# Patient Record
Sex: FEMALE | Race: WHITE | NOT HISPANIC OR LATINO | Employment: FULL TIME | URBAN - METROPOLITAN AREA
[De-identification: names, ages, dates, MRNs, and addresses within clinical notes are randomized per-mention and may not be internally consistent; named-entity substitution may affect disease eponyms.]

---

## 2020-01-27 ENCOUNTER — HOSPITAL ENCOUNTER (OUTPATIENT)
Dept: GENERAL RADIOLOGY | Age: 25
Discharge: HOME | End: 2020-01-27

## 2020-01-27 DIAGNOSIS — T83.32XA IUD STRINGS LOST: ICD-10-CM

## 2020-01-27 PROCEDURE — 74018 RADEX ABDOMEN 1 VIEW: CPT

## 2020-03-26 DIAGNOSIS — T83.32XA IUD STRINGS LOST: Primary | ICD-10-CM

## 2021-03-30 ENCOUNTER — TELEPHONE (OUTPATIENT)
Dept: FAMILY MEDICINE | Age: 26
End: 2021-03-30

## 2022-10-24 ENCOUNTER — OFFICE VISIT (OUTPATIENT)
Dept: FAMILY MEDICINE CLINIC | Facility: CLINIC | Age: 27
End: 2022-10-24
Payer: COMMERCIAL

## 2022-10-24 VITALS
DIASTOLIC BLOOD PRESSURE: 80 MMHG | HEIGHT: 65 IN | WEIGHT: 131.63 LBS | SYSTOLIC BLOOD PRESSURE: 116 MMHG | BODY MASS INDEX: 21.93 KG/M2 | HEART RATE: 78 BPM | RESPIRATION RATE: 16 BRPM | TEMPERATURE: 97 F

## 2022-10-24 DIAGNOSIS — Z00.00 ROUTINE PHYSICAL EXAMINATION: Primary | ICD-10-CM

## 2022-10-24 DIAGNOSIS — Z01.419 ENCOUNTER FOR ANNUAL ROUTINE GYNECOLOGICAL EXAMINATION: ICD-10-CM

## 2022-10-24 PROCEDURE — 3074F SYST BP LT 130 MM HG: CPT | Performed by: FAMILY MEDICINE

## 2022-10-24 PROCEDURE — 3008F BODY MASS INDEX DOCD: CPT | Performed by: FAMILY MEDICINE

## 2022-10-24 PROCEDURE — 90632 HEPA VACCINE ADULT IM: CPT | Performed by: FAMILY MEDICINE

## 2022-10-24 PROCEDURE — 90471 IMMUNIZATION ADMIN: CPT | Performed by: FAMILY MEDICINE

## 2022-10-24 PROCEDURE — 3079F DIAST BP 80-89 MM HG: CPT | Performed by: FAMILY MEDICINE

## 2022-10-24 PROCEDURE — 90713 POLIOVIRUS IPV SC/IM: CPT | Performed by: FAMILY MEDICINE

## 2022-10-24 PROCEDURE — 90472 IMMUNIZATION ADMIN EACH ADD: CPT | Performed by: FAMILY MEDICINE

## 2022-10-24 PROCEDURE — 99385 PREV VISIT NEW AGE 18-39: CPT | Performed by: FAMILY MEDICINE

## 2022-10-24 RX ORDER — AZITHROMYCIN 500 MG/1
500 TABLET, FILM COATED ORAL DAILY
Qty: 3 TABLET | Refills: 0 | Status: SHIPPED | OUTPATIENT
Start: 2022-10-24 | End: 2022-10-27

## 2022-10-24 RX ORDER — LEVONORGESTREL / ETHINYL ESTRADIOL AND ETHINYL ESTRADIOL 150-30(84)
1 KIT ORAL DAILY
Qty: 91 TABLET | Refills: 3 | Status: SHIPPED | OUTPATIENT
Start: 2022-10-24

## 2022-10-24 RX ORDER — LEVONORGESTREL / ETHINYL ESTRADIOL AND ETHINYL ESTRADIOL 150-30(84)
1 KIT ORAL DAILY
COMMUNITY
Start: 2022-06-02 | End: 2022-10-24

## 2022-10-24 RX ORDER — CETIRIZINE HYDROCHLORIDE 10 MG/1
10 TABLET ORAL DAILY
COMMUNITY

## 2022-10-24 RX ORDER — SPIRONOLACTONE 50 MG/1
50 TABLET, FILM COATED ORAL DAILY
COMMUNITY
Start: 2022-09-26

## 2022-10-24 RX ORDER — DEXTROAMPHETAMINE SACCHARATE, AMPHETAMINE ASPARTATE MONOHYDRATE, DEXTROAMPHETAMINE SULFATE AND AMPHETAMINE SULFATE 5; 5; 5; 5 MG/1; MG/1; MG/1; MG/1
20 CAPSULE, EXTENDED RELEASE ORAL EVERY MORNING
COMMUNITY

## 2022-10-24 RX ORDER — ATOVAQUONE AND PROGUANIL HYDROCHLORIDE 250; 100 MG/1; MG/1
1 TABLET, FILM COATED ORAL DAILY
Qty: 20 TABLET | Refills: 0 | Status: SHIPPED | OUTPATIENT
Start: 2022-10-24

## 2023-07-25 NOTE — TELEPHONE ENCOUNTER
Clarification sent regarding pregnancy.       Very High  Pregnancy Warning: Juancarlos Neal 91-DayContraindicated for Pregnancy  Patient pregnancy status is unknown  Details

## 2023-08-01 RX ORDER — LEVONORGESTREL / ETHINYL ESTRADIOL AND ETHINYL ESTRADIOL 150-30(84)
1 KIT ORAL DAILY
Qty: 91 TABLET | Refills: 0 | Status: SHIPPED | OUTPATIENT
Start: 2023-08-01

## 2023-10-06 NOTE — TELEPHONE ENCOUNTER
Please review; No Protocol  Patient comment: Traveling to [place], [place] and [facility] from 11/17-11/27   Rx Pended, authorize if appropriate    Requested Prescriptions   Pending Prescriptions Disp Refills    Atovaquone-Proguanil HCl 250-100 MG Oral Tab 20 tablet 0     Sig: Take 1 tablet by mouth daily.        There is no refill protocol information for this order          Recent Outpatient Visits              11 months ago Routine physical examination    Bernadine Adams [place], [place]    Office Visit          Future Appointments         Provider Department Appt Notes    In 3 months DO Mauricio Garcia [place] annual physical

## 2023-10-08 RX ORDER — ATOVAQUONE AND PROGUANIL HYDROCHLORIDE 250; 100 MG/1; MG/1
1 TABLET, FILM COATED ORAL DAILY
Qty: 20 TABLET | Refills: 0 | Status: SHIPPED | OUTPATIENT
Start: 2023-10-08

## 2023-10-09 NOTE — TELEPHONE ENCOUNTER
Refilled.   Must start a few days before trip, take daily throughout trip and continue for a week after

## 2023-10-09 NOTE — TELEPHONE ENCOUNTER
[employer] message with MD recommendation sent to pt.        Future Appointments   Date Time Provider Ajith Madera   1/11/2024  4:00 PM DO OSIRIS Joel

## 2024-01-11 ENCOUNTER — OFFICE VISIT (OUTPATIENT)
Dept: FAMILY MEDICINE CLINIC | Facility: CLINIC | Age: 29
End: 2024-01-11

## 2024-01-11 VITALS
BODY MASS INDEX: 20.91 KG/M2 | HEART RATE: 83 BPM | DIASTOLIC BLOOD PRESSURE: 77 MMHG | HEIGHT: 64.57 IN | TEMPERATURE: 98 F | WEIGHT: 124 LBS | SYSTOLIC BLOOD PRESSURE: 116 MMHG | RESPIRATION RATE: 16 BRPM

## 2024-01-11 DIAGNOSIS — Z01.419 ENCOUNTER FOR ANNUAL ROUTINE GYNECOLOGICAL EXAMINATION: ICD-10-CM

## 2024-01-11 DIAGNOSIS — R79.9 ABNORMAL BLOOD CHEMISTRY: ICD-10-CM

## 2024-01-11 DIAGNOSIS — Z00.00 ROUTINE PHYSICAL EXAMINATION: Primary | ICD-10-CM

## 2024-01-11 DIAGNOSIS — Z78.9 VEGETARIANISM: ICD-10-CM

## 2024-01-11 PROCEDURE — 3074F SYST BP LT 130 MM HG: CPT | Performed by: FAMILY MEDICINE

## 2024-01-11 PROCEDURE — 99395 PREV VISIT EST AGE 18-39: CPT | Performed by: FAMILY MEDICINE

## 2024-01-11 PROCEDURE — 3078F DIAST BP <80 MM HG: CPT | Performed by: FAMILY MEDICINE

## 2024-01-11 PROCEDURE — 3008F BODY MASS INDEX DOCD: CPT | Performed by: FAMILY MEDICINE

## 2024-01-11 RX ORDER — LEVONORGESTREL / ETHINYL ESTRADIOL AND ETHINYL ESTRADIOL 150-30(84)
1 KIT ORAL DAILY
Qty: 91 TABLET | Refills: 3 | Status: SHIPPED | OUTPATIENT
Start: 2024-01-11

## 2024-01-11 NOTE — PROGRESS NOTES
HPI:  28 yr old female who presents for physical. Single. 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 Engaged and getting  in September in [employer]. Exercising regularly. Just did a marathon. Traveled to [facility] this year.     On Adderall for ADHD.  Follows with Psychiatrist.    Normal periods every 3 months.  On Seasonique. Due for pap.  Never had abnormal.     PMHx: reviewed, see chart  PSHx: reviewed, see chart  All: Penicillins   Meds: see chart    ROS:   Allergic/Immuno:  Negative for environmental allergies and food allergies  Cardiovascular:  Negative for chest pain and irregular heartbeat/palpitations  Constitutional:  Negative for decreased activity, fever, irritability and lethargy  Endocrine:  Negative for abnormal sleep patterns, increased activity, polydipsia and polyphagia  ENMT:  Negative for ear drainage, hearing loss and nasal drainage  Eyes:  Negative for eye discharge and vision loss  Gastrointestinal:  Negative for abdominal pain, constipation, decreased appetite, diarrhea and vomiting  Genitourinary:  Negative for dysuria and hematuria  Hema/Lymph:  Negative for easy bleeding and easy bruising  Integumentary:  Negative for pruritus and rash  Musculoskeletal:  Negative for bone/joint symptoms  Neurological:  Negative for gait disturbance  Psychiatric:  Negative for inappropriate interaction and psychiatric symptoms    PE:  /77   Pulse 83   Temp 97.8 °F (36.6 °C) (Temporal)   Resp 16   Ht 5' 4.57\" (1.64 m)   Wt 124 lb (56.2 kg)   LMP 12/18/2023   BMI 20.91 kg/m²   Gen:  Well-nourished.  No distress.  HEENT: Conjunctive clear.  Ye ear canals clear.  Ye TMs intact with good landmarks noted.  Nares patent.  Oral mucous membrane moist.  Normal lips, teeth, and gums.  Oropharynx normal.  Neck supple.  Good ROM.  No LAD.  Thyroid normal.  CV:  Regular rate and rhythm; no murmurs  Lungs:  Clear to ausculation; good aeration               No wheezes, rales  or rhonchi  Abd: soft, non-tender, non-distended          Normal bowel sounds; no masses          No hepatosplenomegaly  Breasts:  Normal appearance bilateral.  No masses or lesions noted.  Normal nipples bilateral.  No nipple discharge noted.  Normal lymph nodes.  :  External genitalia normal.  Urethral meatus normal.  No lesions noted.  Vaginal mucose normal.  Normal cervix.  No uterine or ovarian masses noted on exam.  Extremities: No cyanosis, clubbing, edema.  Pedal pulses 2+ alan.  MSK:  No abnormalities.  Skin: Warm/dry/intact.  No abnormal moles/lesions noted.  No rashes.  Psych: Orientated to person, place, and time.  Behavior and affect appropriate for age.    A/P:  Encounter Diagnoses   Name Primary?    Routine physical examination    Healthy.  Getting  in September. Labs done.    Yes    Encounter for annual routine gynecological examination    Pap done.  Will call with results.        Vegetarianism    Check iron and vitamin levels.        Abnormal blood chemistry    Check labs today.        Colleen Weiler, DO

## 2024-01-12 LAB
% SATURATION: 21 % (CALC) (ref 16–45)
ALBUMIN/GLOBULIN RATIO: 1.8 (CALC) (ref 1–2.5)
ALBUMIN: 4.7 G/DL (ref 3.6–5.1)
ALKALINE PHOSPHATASE: 44 U/L (ref 31–125)
ALT: 14 U/L (ref 6–29)
AST: 19 U/L (ref 10–30)
BILIRUBIN, TOTAL: 1.3 MG/DL (ref 0.2–1.2)
BUN: 11 MG/DL (ref 7–25)
CALCIUM: 9.6 MG/DL (ref 8.6–10.2)
CARBON DIOXIDE: 25 MMOL/L (ref 20–32)
CHLORIDE: 101 MMOL/L (ref 98–110)
CHOL/HDLC RATIO: 2.6 (CALC)
CHOLESTEROL, TOTAL: 204 MG/DL
CREATININE: 0.66 MG/DL (ref 0.5–0.96)
EGFR: 122 ML/MIN/1.73M2
FERRITIN: 19 NG/ML (ref 16–154)
GLOBULIN: 2.6 G/DL (CALC) (ref 1.9–3.7)
GLUCOSE: 77 MG/DL (ref 65–99)
HDL CHOLESTEROL: 79 MG/DL
HEMATOCRIT: 37.6 % (ref 35–45)
HEMOGLOBIN: 12.9 G/DL (ref 11.7–15.5)
IRON BINDING CAPACITY: 450 MCG/DL (CALC) (ref 250–450)
IRON, TOTAL: 96 MCG/DL (ref 40–190)
LDL-CHOLESTEROL: 107 MG/DL (CALC)
MCH: 31.6 PG (ref 27–33)
MCHC: 34.3 G/DL (ref 32–36)
MCV: 92.2 FL (ref 80–100)
MPV: 10.5 FL (ref 7.5–12.5)
NON-HDL CHOLESTEROL: 125 MG/DL (CALC)
PLATELET COUNT: 411 THOUSAND/UL (ref 140–400)
POTASSIUM: 3.9 MMOL/L (ref 3.5–5.3)
PROTEIN, TOTAL: 7.3 G/DL (ref 6.1–8.1)
RDW: 12 % (ref 11–15)
RED BLOOD CELL COUNT: 4.08 MILLION/UL (ref 3.8–5.1)
SODIUM: 137 MMOL/L (ref 135–146)
TRIGLYCERIDES: 89 MG/DL
TSH: 0.95 MIU/L
VITAMIN B12: 693 PG/ML (ref 200–1100)
VITAMIN D, 25-OH, TOTAL: 43 NG/ML (ref 30–100)
WHITE BLOOD CELL COUNT: 7.2 THOUSAND/UL (ref 3.8–10.8)

## 2024-11-20 ENCOUNTER — OFFICE VISIT (OUTPATIENT)
Dept: FAMILY MEDICINE CLINIC | Facility: CLINIC | Age: 29
End: 2024-11-20
Payer: COMMERCIAL

## 2024-11-20 VITALS
WEIGHT: 131 LBS | HEART RATE: 61 BPM | SYSTOLIC BLOOD PRESSURE: 118 MMHG | DIASTOLIC BLOOD PRESSURE: 79 MMHG | TEMPERATURE: 98 F | RESPIRATION RATE: 16 BRPM | BODY MASS INDEX: 22.09 KG/M2 | HEIGHT: 64.41 IN

## 2024-11-20 DIAGNOSIS — Z00.00 ROUTINE PHYSICAL EXAMINATION: Primary | ICD-10-CM

## 2024-11-20 PROCEDURE — 3008F BODY MASS INDEX DOCD: CPT | Performed by: FAMILY MEDICINE

## 2024-11-20 PROCEDURE — 90471 IMMUNIZATION ADMIN: CPT | Performed by: FAMILY MEDICINE

## 2024-11-20 PROCEDURE — 3078F DIAST BP <80 MM HG: CPT | Performed by: FAMILY MEDICINE

## 2024-11-20 PROCEDURE — 3074F SYST BP LT 130 MM HG: CPT | Performed by: FAMILY MEDICINE

## 2024-11-20 PROCEDURE — 99395 PREV VISIT EST AGE 18-39: CPT | Performed by: FAMILY MEDICINE

## 2024-11-20 PROCEDURE — 90656 IIV3 VACC NO PRSV 0.5 ML IM: CPT | Performed by: FAMILY MEDICINE

## 2024-11-21 NOTE — PROGRESS NOTES
HPI:  29 yr old female who presents for physical. .  Works as nurse for clinical consultant. Exercising regularly.  Just did [place] marathon.  Eating healthy.     Normal periods. Stopped OCPs in September.     Stopped Adderall.  Feels like ADHD symptoms are manageable.     PMHx: reviewed, see chart  PSHx: reviewed, see chart  All: Penicillins   Meds: see chart    ROS:   Allergic/Immuno:  Negative for environmental allergies and food allergies  Cardiovascular:  Negative for chest pain and irregular heartbeat/palpitations  Constitutional:  Negative for decreased activity, fever, irritability and lethargy  Endocrine:  Negative for abnormal sleep patterns, increased activity, polydipsia and polyphagia  ENMT:  Negative for ear drainage, hearing loss and nasal drainage  Eyes:  Negative for eye discharge and vision loss  Gastrointestinal:  Negative for abdominal pain, constipation, decreased appetite, diarrhea and vomiting  Genitourinary:  Negative for dysuria and hematuria  Hema/Lymph:  Negative for easy bleeding and easy bruising  Integumentary:  Negative for pruritus and rash  Musculoskeletal:  Negative for bone/joint symptoms  Neurological:  Negative for gait disturbance  Psychiatric:  Negative for inappropriate interaction and psychiatric symptoms    PE:  /79   Pulse 61   Temp 97.7 °F (36.5 °C) (Temporal)   Resp 16   Ht 5' 4.41\" (1.636 m)   Wt 131 lb (59.4 kg)   LMP 11/04/2024   BMI 22.20 kg/m²   Gen:  Well-nourished.  No distress.  HEENT: Conjunctive clear.  Ye ear canals clear.  Ye TMs intact with good landmarks noted.  Nares patent.  Oral mucous membrane moist.  Normal lips, teeth, and gums.  Oropharynx normal.  Neck supple.  Good ROM.  No LAD.  Thyroid normal.  CV:  Regular rate and rhythm; no murmurs  Lungs:  Clear to ausculation; good aeration               No wheezes, rales or rhonchi  Abd: soft, non-tender, non-distended          Normal bowel sounds; no masses          No  hepatosplenomegaly  Breasts:  Normal appearance bilateral.  No masses or lesions noted.  Normal nipples bilateral.  No nipple discharge noted.  Normal lymph nodes.  Extremities: No cyanosis, clubbing, edema.  Pedal pulses 2+ alan.  MSK:  No abnormalities.  Skin: Warm/dry/intact.  No abnormal moles/lesions noted.  No rashes.    A/P:  Encounter Diagnosis   Name Primary?    Routine physical examination Yes       Healthy.  Exercises regularly. Flu shot given.     Colleen Weiler, DO

## 2025-08-27 ENCOUNTER — TELEPHONE (OUTPATIENT)
Dept: OBGYN CLINIC | Facility: CLINIC | Age: 30
End: 2025-08-27